# Patient Record
Sex: FEMALE | Race: OTHER
[De-identification: names, ages, dates, MRNs, and addresses within clinical notes are randomized per-mention and may not be internally consistent; named-entity substitution may affect disease eponyms.]

---

## 2019-07-16 ENCOUNTER — HOSPITAL ENCOUNTER (EMERGENCY)
Dept: HOSPITAL 74 - JER | Age: 34
Discharge: HOME | End: 2019-07-16
Payer: COMMERCIAL

## 2019-07-16 VITALS — DIASTOLIC BLOOD PRESSURE: 61 MMHG | HEART RATE: 90 BPM | TEMPERATURE: 98.4 F | SYSTOLIC BLOOD PRESSURE: 120 MMHG

## 2019-07-16 VITALS — BODY MASS INDEX: 23.7 KG/M2

## 2019-07-16 DIAGNOSIS — Z3A.12: ICD-10-CM

## 2019-07-16 DIAGNOSIS — O23.41: ICD-10-CM

## 2019-07-16 DIAGNOSIS — O26.891: Primary | ICD-10-CM

## 2019-07-16 LAB
ALBUMIN SERPL-MCNC: 3.3 G/DL (ref 3.4–5)
ALP SERPL-CCNC: 64 U/L (ref 45–117)
ALT SERPL-CCNC: 28 U/L (ref 13–61)
ANION GAP SERPL CALC-SCNC: 6 MMOL/L (ref 8–16)
APPEARANCE UR: CLEAR
AST SERPL-CCNC: 20 U/L (ref 15–37)
BACTERIA # UR AUTO: 835.2 /HPF
BILIRUB SERPL-MCNC: 0.4 MG/DL (ref 0.2–1)
BILIRUB UR STRIP.AUTO-MCNC: NEGATIVE MG/DL
BUN SERPL-MCNC: 5.7 MG/DL (ref 7–18)
CALCIUM SERPL-MCNC: 8.8 MG/DL (ref 8.5–10.1)
CASTS URNS QL MICRO: 4 /LPF (ref 0–8)
CHLORIDE SERPL-SCNC: 107 MMOL/L (ref 98–107)
CO2 SERPL-SCNC: 24 MMOL/L (ref 21–32)
COLOR UR: YELLOW
CREAT SERPL-MCNC: 0.5 MG/DL (ref 0.55–1.3)
DEPRECATED RDW RBC AUTO: 11.6 % (ref 11.6–15.6)
EPITH CASTS URNS QL MICRO: 4.5 /HPF
GLUCOSE SERPL-MCNC: 70 MG/DL (ref 74–106)
HCT VFR BLD CALC: 35 % (ref 32.4–45.2)
HGB BLD-MCNC: 12.4 GM/DL (ref 10.7–15.3)
KETONES UR QL STRIP: (no result)
LEUKOCYTE ESTERASE UR QL STRIP.AUTO: (no result)
MCH RBC QN AUTO: 31.3 PG (ref 25.7–33.7)
MCHC RBC AUTO-ENTMCNC: 35.5 G/DL (ref 32–36)
MCV RBC: 88.1 FL (ref 80–96)
NITRITE UR QL STRIP: NEGATIVE
PH UR: 7.5 [PH] (ref 5–8)
PLATELET # BLD AUTO: 233 K/MM3 (ref 134–434)
PMV BLD: 9 FL (ref 7.5–11.1)
POTASSIUM SERPLBLD-SCNC: 4.1 MMOL/L (ref 3.5–5.1)
PROT SERPL-MCNC: 6.8 G/DL (ref 6.4–8.2)
PROT UR QL STRIP: NEGATIVE
PROT UR QL STRIP: NEGATIVE
RBC # BLD AUTO: 3.97 M/MM3 (ref 3.6–5.2)
RBC # BLD AUTO: 6 /HPF (ref 0–4)
SODIUM SERPL-SCNC: 137 MMOL/L (ref 136–145)
SP GR UR: 1.01 (ref 1.01–1.03)
UROBILINOGEN UR STRIP-MCNC: 1 MG/DL (ref 0.2–1)
WBC # BLD AUTO: 10.4 K/MM3 (ref 4–10)
WBC # UR AUTO: 10 /HPF (ref 0–5)

## 2019-07-16 PROCEDURE — 3E0337Z INTRODUCTION OF ELECTROLYTIC AND WATER BALANCE SUBSTANCE INTO PERIPHERAL VEIN, PERCUTANEOUS APPROACH: ICD-10-PCS

## 2019-07-16 PROCEDURE — 3E033NZ INTRODUCTION OF ANALGESICS, HYPNOTICS, SEDATIVES INTO PERIPHERAL VEIN, PERCUTANEOUS APPROACH: ICD-10-PCS

## 2019-07-16 PROCEDURE — 3E033GC INTRODUCTION OF OTHER THERAPEUTIC SUBSTANCE INTO PERIPHERAL VEIN, PERCUTANEOUS APPROACH: ICD-10-PCS

## 2019-07-16 NOTE — PDOC
History of Present Illness





- General


Chief Complaint: Headache


Stated Complaint: DIZZINESS


Time Seen by Provider: 19 12:06


History Source: Patient, Sibling


Exam Limitations: Language Barrier ([place] only, translation via sister)





- History of Present Illness


Initial Comments: 


35 yo , currently 12 weeks pregnant, presenting with neck pain. [place] 

speaking only, translation via sister. Woke up out of sleep around 0330 this 

morning with the neck pain and occipital headache, later developed nausea and 

vomiting X6 around 0800. Patient went to Urgent Care around 0900 and was told 

she had "palpitations and high blood pressure", referred to the ED. Has had 

intermittent nausea and vomiting for the past 2 weeks with poor oral intake 

despite taking a nausea medication that her OBGYN gave her every day. Endorses 

dizziness that feels like "the room is spinning" and is triggered by sitting up 

quickly.





Denies any tingling in her fingers, neurological deficits or weakness, diarrhea/

constipation, CP, SOB, abdominal pain, vaginal bleeding, fevers/chills, or 

recent illness.  


19 12:21








Past History





- Past Medical History


Allergies/Adverse Reactions: 


 Allergies











Allergy/AdvReac Type Severity Reaction Status Date / Time


 


Penicillins Allergy   Verified 19 12:55











Home Medications: 


Ambulatory Orders





Nitrofurantoin Monohyd/M-Cryst [Macrobid -] 100 mg PO BID #14 capsule 19 


Prenat 115/Iron Fum/Folic/Dss [Prenatal 19 Tablet] 1 each PO DAILY 19 








COPD: No


Thyroid Disease: No





- Surgical History


Cholecystectomy: Yes





- Immunization History


Immunization Up to Date: Yes





- Suicide/Smoking/Psychosocial Hx


Smoking History: Never smoked


Information on smoking cessation initiated: No


Hx Alcohol Use: No


Drug/Substance Use Hx: No





Neuro Specific PMHX





- Complaint Specific PMHX


Glaucoma: No


Herniated Disk: No


Laminectomy: No


Migraine: No


Multiple Sclerosis: No


Neuropathy: No


TIA: No





**Review of Systems





- Review of Systems


Able to Perform ROS?: Yes


Is the patient limited English proficient: Yes


Constitutional: No: Chills, Fever


HEENTM: No: Eye Pain, Blurred Vision, Recent change in vision, Double Vision, 

Ear Discharge, Nose Pain, Tinnitus, Hearing Loss, Throat Swelling, Mouth Pain


Respiratory: No: Cough, Orthopnea, Shortness of Breath


Cardiac (ROS): No: Chest Pain, Lightheadedness


ABD/GI: Yes: Nausea, Poor Fluid Intake, Vomiting.  No: Constipated, Diarrhea


: No: Burning, Dysuria, Discharge, Frequency


Musculoskeletal: Yes: Neck Pain (occipital down through cervical spine only)


Neurological: Yes: Headache (occipital), Dizziness ("room is spinning", 

triggered by standing quickly).  No: Numbness, Paresthesia, Pre-Existing Deficit

, Seizure, Tingling, Weakness, Ataxia





*Physical Exam





- Vital Signs


 Last Vital Signs











Temp Pulse Resp BP Pulse Ox


 


 98.5 F   114 H  16   113/70   98 


 


 19 11:09  19 11:09  19 11:09  19 11:09  19 11:09














- Physical Exam


General Appearance: Yes: Appropriately Dressed


HEENT: positive: EOMI, DARWIN, Normal ENT Inspection, Normal Voice, Symmetrical, 

Pharynx Normal


Neck: positive: Trachea midline, Normal Thyroid


Respiratory/Chest: positive: Lungs Clear, Normal Breath Sounds.  negative: 

Respiratory Distress


Cardiovascular: positive: Regular Rhythm, Tachycardia, Other (sinus arrhythmia)


Gastrointestinal/Abdominal: positive: Normal Bowel Sounds, Soft.  negative: 

Tender


Musculoskeletal: positive: Normal Inspection, Other (cervical spine tenderness)

.  negative: CVA Tenderness


Extremity: positive: Normal Inspection, Normal Range of Motion


Integumentary: positive: Normal Color, Dry, Cold


Neurologic: positive: CNs II-XII NML intact, Fully Oriented, Alert, Normal Mood/

Affect, Normal Response, Motor Strength 5/5, Responsive, Finger to Nose (intact)

.  negative: Facial Droop, Sensory Deficit, Confused, Disoriented





ED Treatment Course





- LABORATORY


CBC & Chemistry Diagram: 


 19 12:34





 19 12:34





Medical Decision Making





- Medical Decision Making


EKG reviewed, NSR, HR 75, no STEMI.


19 12:07


Dizziness seems consistent with orthostatic hypotension in s/o poor oral intake

, N/V, and with elevated HR. Will give 1L NS, 1000mg acetaminophen for HA/neck 

pain, Zofran 4mg IVpush, also get CBC CMP UA/UC.


19 12:44


UA reviewed, prescribed Macrobid BID for a week, sent script to patient's 

pharmacy.


19 13:22


CMP reviewed, Glucose 70


19 13:25


Patient drinking some apple juice and eating some honey loan crackers. Will 

hold off on repleting glucose.


19 13:29


WBC 10.4


19 13:56








*DC/Admit/Observation/Transfer


Diagnosis at time of Disposition: 


 UTI (urinary tract infection)








- Discharge Dispostion


Disposition: HOME


Condition at time of disposition: Improved


Decision to Admit order: No





- Prescriptions


Prescriptions: 


Nitrofurantoin Monohyd/M-Cryst [Macrobid -] 100 mg PO BID #14 capsule





- Referrals


Referrals: 


Craole Claros MD [Primary Care Provider] - 





- Patient Instructions


Printed Discharge Instructions:  Managing Symptoms of Pregnancy, DI for Urinary 

Tract Infection (UTI)


Additional Instructions: 


Please take your Macrobid twice a day for one full week. Please follow up with 

your primary care doctor. Do your best to remain hydrated. Return to the ED for 

abdominal pain, vaginal bleeding, or nausea and vomiting that does not respond 

to medications.





- Post Discharge Activity

## 2019-07-16 NOTE — PDOC
Documentation entered by Fili Darling SCRIBE, acting as scribe for Stephanie Johnson MD.








Stephanie Johnson MD:  This documentation has been prepared by the Lisset tavarez Elijah, SCRIBE, under my direction and personally reviewed by me in its 

entirety.  I confirm that the documentation accurately reflects all work, 

treatment, procedures, and medical decision making performed by me.  





Attending Attestation





- Resident


Resident Name: CodyMichoacano





- ED Attending Attestation


I have performed the following: I have examined & evaluated the patient, The 

case was reviewed & discussed with the resident, I agree w/resident's findings 

& plan, Exceptions are as noted





- HPI


HPI: 





19 13:51


Patient is a 34 year old female  (13 weeks) with no reported significant 

past medical history who presents to the ED with neck pain that radiates all 

throughout the back of her neck lasting for x10 hours. Patient associates nausea

, x6 episodes of vomiting, and dizziness and was seen at Urgent Care, found to 

have palpitations and was told to come into the ED. Patient also noted that she 

has had nausea and vomiting for the last two weeks.


Denies Abdominal Pain.


Allergies: Penicillins 


PCP: Dr. Claros








- Physicial Exam


PE: 





GENERAL: Awake, alert, and fully oriented, in no acute distress. +Mild pallor


HEAD: No signs of trauma


EYES: PERRLA, EOMI, sclera anicteric, conjunctiva clear


ENT: Auricles normal inspection, hearing grossly normal, nares patent, 

oropharynx clear without exudates. Dry mucosa


NECK: Normal ROM, supple, no lymphadenopathy, JVD, or masses


LUNGS: Breath sounds equal, clear to auscultation bilaterally.  No wheezes, and 

no crackles


HEART: Regular rate and rhythm, normal S1 and S2, no murmurs, rubs or gallops


ABDOMEN: Soft, nontender, normoactive bowel sounds.  No guarding, no rebound.  

No masses


EXTREMITIES: Normal range of motion, no edema.  No clubbing or cyanosis. No 

cords, erythema, or tenderness


NEUROLOGICAL: Cranial nerves II through XII grossly intact.  Normal speech, 

normal gait. Motor and sensation intact


SKIN: Warm, dry, normal turgor, no rashes or lesions noted. 








- Medical Decision Making





Pt appears dehydrated on exam, will rehydrate with NS and D5LR. Will treat with 

ofirmev for neck strain. Stable for DC home when improved.

## 2019-07-17 NOTE — EKG
Test Reason : 

Blood Pressure : ***/*** mmHG

Vent. Rate : 075 BPM     Atrial Rate : 075 BPM

   P-R Int : 122 ms          QRS Dur : 084 ms

    QT Int : 362 ms       P-R-T Axes : 029 015 027 degrees

   QTc Int : 404 ms

 

NORMAL SINUS RHYTHM

CANNOT RULE OUT ANTERIOR INFARCT , AGE UNDETERMINED

ABNORMAL ECG

NO PREVIOUS ECGS AVAILABLE

Confirmed by LIZZIE PHAN MD ([id number]) on 7/17/2019 10:30:09 AM

 

Referred By:             Confirmed By:LIZZIE PHAN MD

## 2020-01-06 ENCOUNTER — HOSPITAL ENCOUNTER (INPATIENT)
Dept: HOSPITAL 74 - JLDR | Age: 35
LOS: 3 days | Discharge: HOME | DRG: 540 | End: 2020-01-09
Attending: STUDENT IN AN ORGANIZED HEALTH CARE EDUCATION/TRAINING PROGRAM | Admitting: STUDENT IN AN ORGANIZED HEALTH CARE EDUCATION/TRAINING PROGRAM
Payer: COMMERCIAL

## 2020-01-06 VITALS — BODY MASS INDEX: 28.8 KG/M2

## 2020-01-06 DIAGNOSIS — Z3A.37: ICD-10-CM

## 2020-01-06 DIAGNOSIS — O34.219: Primary | ICD-10-CM

## 2020-01-06 LAB
ALBUMIN SERPL-MCNC: 2.6 G/DL (ref 3.4–5)
ALP SERPL-CCNC: 503 U/L (ref 45–117)
ALT SERPL-CCNC: 42 U/L (ref 13–61)
ANION GAP SERPL CALC-SCNC: 8 MMOL/L (ref 8–16)
APTT BLD: 29.1 SECONDS (ref 25.2–36.5)
AST SERPL-CCNC: 43 U/L (ref 15–37)
BASOPHILS # BLD: 0.8 % (ref 0–2)
BILIRUB SERPL-MCNC: 0.4 MG/DL (ref 0.2–1)
BUN SERPL-MCNC: 10.2 MG/DL (ref 7–18)
CALCIUM SERPL-MCNC: 9.1 MG/DL (ref 8.5–10.1)
CHLORIDE SERPL-SCNC: 107 MMOL/L (ref 98–107)
CO2 SERPL-SCNC: 21 MMOL/L (ref 21–32)
CREAT SERPL-MCNC: 0.9 MG/DL (ref 0.55–1.3)
CREAT UR-MCNC: 143 MG/DL (ref 30–150)
DEPRECATED RDW RBC AUTO: 13.7 % (ref 11.6–15.6)
EOSINOPHIL # BLD: 0.8 % (ref 0–4.5)
GLUCOSE SERPL-MCNC: 60 MG/DL (ref 74–106)
HCT VFR BLD CALC: 39.1 % (ref 32.4–45.2)
HGB BLD-MCNC: 12.9 GM/DL (ref 10.7–15.3)
INR BLD: 0.95 (ref 0.83–1.09)
LYMPHOCYTES # BLD: 27.1 % (ref 8–40)
MCH RBC QN AUTO: 28.4 PG (ref 25.7–33.7)
MCHC RBC AUTO-ENTMCNC: 33 G/DL (ref 32–36)
MCV RBC: 86 FL (ref 80–96)
MONOCYTES # BLD AUTO: 6.6 % (ref 3.8–10.2)
NEUTROPHILS # BLD: 64.7 % (ref 42.8–82.8)
PLATELET # BLD AUTO: 181 K/MM3 (ref 134–434)
PMV BLD: 10.6 FL (ref 7.5–11.1)
POTASSIUM SERPLBLD-SCNC: 4.6 MMOL/L (ref 3.5–5.1)
PROT SERPL-MCNC: 6.7 G/DL (ref 6.4–8.2)
PT PNL PPP: 11.2 SEC (ref 9.7–13)
RBC # BLD AUTO: 4.54 M/MM3 (ref 3.6–5.2)
SODIUM SERPL-SCNC: 137 MMOL/L (ref 136–145)
WBC # BLD AUTO: 8.5 K/MM3 (ref 4–10)

## 2020-01-06 RX ADMIN — LABETALOL HYDROCHLORIDE SCH MG: 200 TABLET, FILM COATED ORAL at 19:40

## 2020-01-06 RX ADMIN — LABETALOL HYDROCHLORIDE SCH MG: 200 TABLET, FILM COATED ORAL at 22:45

## 2020-01-06 NOTE — PN
Progress Note (short form)





- Note


Progress Note: 


5 mg IV hydralazine and 200mg PO labetalol administered for severe range BP 

following Methergine. Patient is asymptomatic and bleeding has subsided. 

Magnesium will be held due to iatrogenic severe hypertension. BP will be 

monitored closely.

## 2020-01-06 NOTE — PN
Progress Note (short form)





- Note


Progress Note: 





Attended Rpt. C/S for this 35yrs old  mother for GHTN


Mat PNL- nl GBS- neg


Infant delivered - clear fluid, cried soon after 


suctioned/ dried cord 3V


Apgar 9/9, CAN x 1


Infant's PE exam;


Infant alert active- not in distress


HEENT- nl AFOF


Chest B/l symm COR S1-S2 nl nlo heart murmur


 nl female Testis down 


Ext: FROM 


Neuro: Good tone and activity.


RNBC


Watch for resp distrtess


Encourage BF/ Bonding





Problem List





- Problems


(1) 37 weeks gestation of pregnancy


Code(s): Z3A.37 - 37 WEEKS GESTATION OF PREGNANCY   





(2) Gestational hypertension


Code(s): O13.9 - GESTATIONAL HTN W/O SIGNIFICANT PROTEINURIA, UNSP TRIMESTER

## 2020-01-06 NOTE — OP
Operative Note





- Note:


Operative Date: 01/06/20 (dict # [id number])


Pre-Operative Diagnosis: P1 @ 37.1 wks PEC w/o severe features, previous CD 

desiring repeat


Operation: RLTCS


Findings: 





see dictation


Post-Operative Diagnosis: Same as Pre-op


Surgeon: Kodi Benavidez


Assistant: Alvino Fleming


Anesthesia: Spinal


Estimated Blood Loss (mls): 700


Fluid Volume Replaced (mls): 1,000


Operative Report Dictated: Yes

## 2020-01-06 NOTE — HP
Past Medical History





- Primary Care Physician


PCP:: Kodi Benavidez





- Admission


Chief Complaint: mildly elevated BP


History of Present Illness: 





Patient denies any headache, CP, SOB, n/v, RUQ pain. 


History Source: Patient


Limitations to Obtaining History: No Limitations





- Past Medical History


CNS: No: Alzheimer's, CVA, Dementia, Migraine, Multiple Sclerosis, Peripheral 

Neuropathy, Parkinson's, Seizure, Syncope, TIA, Vertigo, Other


Cardiovascular: No: AFIB, Aneurysm, Aortic Insufficiency, Aortic Stenosis, CAD, 

CHF, Deep Vein Thrombosis, HTN, Hyperlipdemia, MI, Mitral Insufficiency, Mitral 

Stenosis, Murmur, Pulmonary Hypertension, Other


Pulmonary: No: Asthma, Bronchitis, Cancer, COPD, O2 Dependent, Pneumonia, 

Previously Intubated, Pulmonary Embolus, Pulmonary Fibrosis, Sleep Apnea, Other


Gastrointestinal: No: Ascites, Cancer, Constipation, Crohn's Disease, 

Diverticulitis, Diverticulosis, Esophageal Varices, Gastritis, GERD, GI Bleed, 

Hemorrhoids, Hiatal Hernia, Inflamatory Bowel Disease, Irritable Bowel Disease, 

Pancreatitis, Peptic Ulcer Disease, Ulcerative Colitis, Other


Hepatobiliary: No: Cirrhosis, Cholelithiasis, Cholecystitis, Choledocholithiasis

, Hepatitis A, Hepatitis B, Hepatitis C, Other


Renal/: No: Renal Failure, Renal Inusuff, BPH, Cancer, Hematuria, Hemodialysis

, Neurogenic Bladder, Renal Calculi, UTI, Other


Reproductive: No: Ectopic Pregnancy, Endometriosis, Fibroids, PID, Polycystic 

Ovary Syndrome, Postmenopausal, Other


...: 3


...Para: 1


...Term: 1


...: 0


...Spon : 1


...Induced : 0


...Multiple Gestation: 0


...LMP: 19


... Weeks Gestation by Dates: 39.3


...EDC by Dates: 01/10/20


...EDC by Sono: 20


Heme/Onc: No: Anemia, B12 Deficiency, Bleeding Disorder, Cancer, Current 

Chemotherapy, Current Radiation Therapy, Hemochromatosis, Hypercoaguable State, 

Myeloproliferative Synd, Sickle Cell Disease, Sickle Cell Trait, 

Thrombocytopenia, Other


Infectious Disease: No: AIDS, C-Diff, Herpes Zoster, HIV, MRSA, STD's, 

Tuberculosis, VREF, Other


Psych: No: Addictions, Anxiety, Bipolar, Depression, Panic, Psychosis, 

Schizophrenia, Other


Musculoskeletal: No: Bursitis, Chronic low back pain, Hemiparesis, Hemiplegia, 

Osteoarthritis, Paraplegia, Other


ENT: No: Allergic Rhinitis, Sinusitis, Other


Endocrine: No: Gregory's Disease, Cushing's Disease, Diabetes Insipidus, 

Diabetes Mellitus, Hyperparathyroidism, Hyperthyroidism, Hypothyroidism, 

Osteopenia, SIADH, Other


Dermatology: No: Basal Cell, Cellulitis, Eczema, Melanoma, Psoriasis, Squamous 

Cell, Other





- Past Surgical History


Past Surgical History: Yes: Cholecystectomy, 


Hx Myomectomy: No


Hx Transabdominal Cerclage: No





- Smoking History


Smoking history: Never smoked


Have you smoked in the past 12 months: No





- Alcohol/Substance Use


Hx Alcohol Use: No





Home Medications





- Allergies


Allergies/Adverse Reactions: 


 Allergies











Allergy/AdvReac Type Severity Reaction Status Date / Time


 


Penicillins Allergy Severe Swelling Verified 20 13:25














- Home Medications


Home Medications: 


Ambulatory Orders





Prenat 115/Iron Fum/Folic/Dss [Prenatal 19 Tablet] 1 each PO DAILY 19 











Family Medical History


Family History: Unable to Obtain





Review of Systems





- Review of Systems


Constitutional: reports: No Symptoms


Eyes: reports: No Symptoms


HENT: reports: No Symptoms


Neck: reports: No Symptoms


Cardiovascular: reports: No Symptoms


Respiratory: reports: No Symptoms


Gastrointestinal: reports: No Symptoms


Genitourinary: reports: No Symptoms


Breasts: reports: No Symptoms Reported


Musculoskeletal: reports: No Symptoms


Integumentary: reports: No Symptoms


Neurological: reports: No Symptoms


Endocrine: reports: No Symptoms


Hematology/Lymphatic: reports: No Symptoms


Psychiatric: reports: No Symptoms





Physical Exam - Maternity


Vital Signs: 


 Vital Signs











Temperature  98.0 F   20 15:01


 


Pulse Rate  77   20 15:01


 


Respiratory Rate  18   20 15:01


 


Blood Pressure  129/95   20 15:01


 


O2 Sat by Pulse Oximetry (%)      











Constitutional: Yes: Well Nourished


Eyes: Yes: WNL


HENT: Yes: Atraumatic


Neck: Yes: Supple


Cardiovascular: Yes: Regular Rate and Rhythm


Breast(s): Yes: Other (deferred)





- Abdominal Exam/OB


Number of Fetuses: Single


Fetal Presentation: Vertex


Contractions: Yes


Regularity: Irregular


Intensity: Unaware


Fetal Monitor Mode: External


Fetal Heart Rate (range): 135


Category: I


Accelerations: Uniform


Decelerations: None





- Vaginal Exam/OB


Vaginal Bleediing: No





- Physical Exam


Musculoskeletal: Yes: WNL, Muscle Weakness


Edema: Yes


Edema: LLE: Trace, RLE: Trace


Integumentary: Yes: WNL


...Motor Strength: WNL


Psychiatric: Yes: Alert, Oriented





- Labs


Lab Results: 


 CBC, BMP





 20 14:01 











Imaging





- Results


Ultrasound: Report Reviewed





Assessment/Plan





34 y/o @ 37.1wks, prior CS x1 desiring repeat, GHTN and BP in mild range, 

asymptomatic, reassuring fetal status, declined BTL. Risks and complications of 

procedure discussed as well as indication for early term delivery. All 

questions answered and informed consent obtained


-Proceed with CD

## 2020-01-07 LAB
ALBUMIN SERPL-MCNC: 1.8 G/DL (ref 3.4–5)
ALP SERPL-CCNC: 343 U/L (ref 45–117)
ALT SERPL-CCNC: 35 U/L (ref 13–61)
ANION GAP SERPL CALC-SCNC: 7 MMOL/L (ref 8–16)
AST SERPL-CCNC: 48 U/L (ref 15–37)
BASOPHILS # BLD: 0.3 % (ref 0–2)
BILIRUB SERPL-MCNC: 0.8 MG/DL (ref 0.2–1)
BUN SERPL-MCNC: 6.2 MG/DL (ref 7–18)
CALCIUM SERPL-MCNC: 7.9 MG/DL (ref 8.5–10.1)
CHLORIDE SERPL-SCNC: 109 MMOL/L (ref 98–107)
CO2 SERPL-SCNC: 21 MMOL/L (ref 21–32)
CREAT SERPL-MCNC: 0.7 MG/DL (ref 0.55–1.3)
DEPRECATED RDW RBC AUTO: 13.8 % (ref 11.6–15.6)
EOSINOPHIL # BLD: 0.3 % (ref 0–4.5)
GLUCOSE SERPL-MCNC: 59 MG/DL (ref 74–106)
HCT VFR BLD CALC: 30.2 % (ref 32.4–45.2)
HGB BLD-MCNC: 10.2 GM/DL (ref 10.7–15.3)
LYMPHOCYTES # BLD: 14.9 % (ref 8–40)
MCH RBC QN AUTO: 28.7 PG (ref 25.7–33.7)
MCHC RBC AUTO-ENTMCNC: 33.7 G/DL (ref 32–36)
MCV RBC: 85.1 FL (ref 80–96)
MONOCYTES # BLD AUTO: 4.7 % (ref 3.8–10.2)
NEUTROPHILS # BLD: 79.8 % (ref 42.8–82.8)
PLATELET # BLD AUTO: 139 K/MM3 (ref 134–434)
PMV BLD: 10.2 FL (ref 7.5–11.1)
POTASSIUM SERPLBLD-SCNC: 4.3 MMOL/L (ref 3.5–5.1)
PROT SERPL-MCNC: 4.7 G/DL (ref 6.4–8.2)
RBC # BLD AUTO: 3.55 M/MM3 (ref 3.6–5.2)
SODIUM SERPL-SCNC: 137 MMOL/L (ref 136–145)
WBC # BLD AUTO: 10.2 K/MM3 (ref 4–10)

## 2020-01-07 RX ADMIN — LABETALOL HYDROCHLORIDE SCH MG: 200 TABLET, FILM COATED ORAL at 09:28

## 2020-01-07 RX ADMIN — ACETAMINOPHEN PRN MG: 325 TABLET ORAL at 19:42

## 2020-01-07 RX ADMIN — SIMETHICONE CHEW TAB 80 MG PRN MG: 80 TABLET ORAL at 19:41

## 2020-01-07 RX ADMIN — Medication SCH MLS/HR: at 05:28

## 2020-01-07 RX ADMIN — Medication SCH: at 19:33

## 2020-01-07 RX ADMIN — LABETALOL HYDROCHLORIDE SCH MG: 200 TABLET, FILM COATED ORAL at 21:48

## 2020-01-07 NOTE — OP
DATE OF OPERATION:  

 

DATE OF DICTATION:  2020

 

PREOPERATIVE DIAGNOSIS:  35-year-old para 1 at 37 and 1 weeks' gestation,

preeclampsia without severe features, previous  section desiring repeat

 delivery.  Declined sterilization.

 

POSTOPERATIVE DIAGNOSIS:  35-year-old para 1 at 37 and 1 weeks' gestation,

preeclampsia without severe features, previous  section desiring repeat

 delivery.  Declined sterilization.

 

PROCEDURE:  Repeat low transverse  section.

 

COMPLICATIONS:  None.

 

ATTENDING:  Clayton Breaux MD 

 

FIRST ASSISTANT:  ROSEMARY Roper 

 

ANESTHESIA:  Spinal.

 

ESTIMATED BLOOD LOSS:  700 mL.

 

INTRAVENOUS FLUIDS:  1 L of crystalloid.

 

FINDINGS:  Anterior abdominal wall anatomy consistent with prior  
section. 

Moderate amount of subcutaneous adipose tissue.  The fascia was nonfibrotic,

nonadherent to the underlying rectus muscles.  The rectus muscles fused to each 
other

in the midline.  No parietal peritoneal visceral adhesions.  The bladder was 
adherent

to the lower uterine segment.  Bladder dome and rectus muscle fascial interface 
were

intact at the conclusion of the procedure.  Infant in cephalic presentation.  
Clear

amniotic fluid.  Live viable female.  Uterus and bilateral tubes and ovaries

consistent with normal anatomy.  The ovaries were bilaterally polycystic.

 

DESCRIPTION OF PROCEDURE:  The patient was taken to the operating room where

anesthesia was found to be adequate.  She was then prepped and draped in a 
normal

sterile fashion.  Urinary Kruse catheter was placed atraumatically.  Appropriate

time-out took place.  Pfannenstiel skin incision was made with a scalpel 
following

prior  section scar.  Incision was carried to the underlying fascia 
with the

Bovie.  The fascia was incised in the midline and incision extended laterally 
with

sharp dissection.  The underlying rectus muscles were dissected off sharply.  
The

rectus muscles were elevated at the midline and dissected off sharply and 
superiorly.

 Incidental entry to the peritoneal cavity revealed no visceral parietal 
peritoneal

adhesions.  The incision was extended laterally with blunt and sharp 
dissection. 

Bladder blade was placed in the lower uterine segment, was slightly effaced. 

Transverse lower uterine segment incision was made with a scalpel approximately 
5 cm

above the vesicouterine junction.  The incision was extended laterally with 
blunt

dissection.  Amniotomy revealed clear amniotic fluid.  Infant was delivered 
through

the surgical incision with mild fundal pressure without difficulty.  Delayed

umbilical cord clamping took place, and the infant was handed off to the NICU 
staff. 

The placenta was delivered manually and intact.  The uterus was exteriorized 
through

the surgical incision, and the intrauterine cavity was cleared of all clots and

debris.  The lower uterine segment incision was reapproximated with 1-0 Polysorb

running, locked suture.  Excellent structural reapproximation and hemostasis 
with

1-layer suture.  The uterus was internalized to the pelvic cavity, and gutters 
were

cleared of all clots and debris.  Inspection of the uterine incision once again

revealed excellent hemostasis.  Bladder dome and rectus muscle fascial 
interface as

described previously.  Fascial incision was reapproximated with 0 Polysorb 
running,

nonlocked suture.  Excellent structural reapproximation achieved and confirmed 
by

digital palpation by the surgeon.  Subcutaneous tissues were copiously 
irrigated and

bleeders neutralized with Bovie cautery.  The skin incision was reapproximated 
with

4-0 Biosyn subcuticular sutures.  Excellent reapproximation hemostasis 
achieved. 

Patient in stable condition to the recovery room.  Instrument count was 
reported as

correct x2 by the staff. 

 

 

CLAYTON BREAUX MD [id number]

DD: 2020 17:47

DT: 2020 13:28

Job #:  [id number]

[facility]

## 2020-01-07 NOTE — PN
Post Partum Progress Note





- Subjective


Subjective: 





not yet ambulating, napoles in place, lochia decreased, passing flatus, no n/v


Type of Delivery: Repeat C/S


Vital Signs: 


 Vital Signs











Temperature  97.8 F   01/07/20 06:00


 


Pulse Rate  54 L  01/07/20 06:00


 


Respiratory Rate  18   01/07/20 06:00


 


Blood Pressure  131/72   01/07/20 06:00


 


O2 Sat by Pulse Oximetry (%)      











Breast Exam: Yes: Other (deferred)


Uterus: Yes: Fundus Firm


Incision: Yes: Dressing dry and intact, Sutures intact


Abdomen/GI: Yes: Abdomen soft, Passing flatus (slightly distended, + BS)


Lochia, amount: Moderate


Extremities: Yes: Calves non-tender


Perineum: Yes: Intact


Activity: Other





- Labs


Labs: 


 CBC











WBC  8.5 K/mm3 (4.0-10.0)   01/06/20  14:01    


 


RBC  4.54 M/mm3 (3.60-5.2)   01/06/20  14:01    


 


Hgb  12.9 GM/dL (10.7-15.3)   01/06/20  14:01    


 


Hct  39.1 % (32.4-45.2)   01/06/20  14:01    


 


MCV  86.0 fl (80-96)   01/06/20  14:01    


 


MCH  28.4 pg (25.7-33.7)   01/06/20  14:01    


 


MCHC  33.0 g/dl (32.0-36.0)   01/06/20  14:01    


 


RDW  13.7 % (11.6-15.6)  D 01/06/20  14:01    


 


Plt Count  181 K/MM3 (134-434)   01/06/20  14:01    


 


MPV  10.6 fl (7.5-11.1)  D 01/06/20  14:01    


 


Absolute Neuts (auto)  5.5 K/mm3 (1.5-8.0)   01/06/20  14:01    


 


Neutrophils %  64.7 % (42.8-82.8)   01/06/20  14:01    


 


Lymphocytes %  27.1 % (8-40)   01/06/20  14:01    


 


Monocytes %  6.6 % (3.8-10.2)   01/06/20  14:01    


 


Eosinophils %  0.8 % (0-4.5)   01/06/20  14:01    


 


Basophils %  0.8 % (0-2.0)   01/06/20  14:01    


 


Nucleated RBC %  0 % (0-0)   01/06/20  14:01    














Assessment/Plan





POD # 1 in stable condition, S/P RCD due to PEC w/o severe features, S/P 

iatrogenic severe BPs and BPs now in normal range on PO antihypertensives.


Am labs


Encourage ambulation


Advance diet


Napoles out


Continue PO labetalol

## 2020-01-08 RX ADMIN — SIMETHICONE CHEW TAB 80 MG PRN MG: 80 TABLET ORAL at 09:21

## 2020-01-08 RX ADMIN — ACETAMINOPHEN PRN MG: 325 TABLET ORAL at 09:20

## 2020-01-08 RX ADMIN — LABETALOL HYDROCHLORIDE SCH MG: 200 TABLET, FILM COATED ORAL at 09:21

## 2020-01-08 RX ADMIN — LABETALOL HYDROCHLORIDE SCH MG: 200 TABLET, FILM COATED ORAL at 22:37

## 2020-01-08 RX ADMIN — ACETAMINOPHEN PRN MG: 325 TABLET ORAL at 22:37

## 2020-01-08 NOTE — PN
Post Partum Progress Note





- Subjective


Subjective: 





pain scale 6/10 


oob 


voiding without difficulty 


bm not done 


Post Partum Day: 2


Type of Delivery: Repeat C/S


Vital Signs: 


 Vital Signs











Temperature  98.4 F   20 22:00


 


Pulse Rate  109 H  20 06:00


 


Respiratory Rate  18   20 06:00


 


Blood Pressure  131/79   20 06:00


 


O2 Sat by Pulse Oximetry (%)      











Breast Exam: Yes: Soft, Other (BF ).  No: Engorged


Uterus: Yes: Fundus Firm, Fundus below umbilicus, Non-tender


Incision: Yes: Sutures intact (ster).  No: Redness, Oozing


Abdomen/GI: Yes: Abdomen soft, Passing flatus, Tolerating PO (diet).  No: 

Abdominal Distention, Tender


Lochia: Yes: Rubra


Lochia, amount: Moderate


Extremities: Yes: Calves non-tender


Perineum: Yes: Intact


Activity: Ambulating





- Labs


Labs: 


 CBC











WBC  10.2 K/mm3 (4.0-10.0)  H  20  07:53    


 


RBC  3.55 M/mm3 (3.60-5.2)  L  20  07:53    


 


Hgb  10.2 GM/dL (10.7-15.3)  L  20  07:53    


 


Hct  30.2 % (32.4-45.2)  L D 20  07:53    


 


MCV  85.1 fl (80-96)   20  07:53    


 


MCH  28.7 pg (25.7-33.7)   20  07:53    


 


MCHC  33.7 g/dl (32.0-36.0)   20  07:53    


 


RDW  13.8 % (11.6-15.6)   20  07:53    


 


Plt Count  139 K/MM3 (134-434)  D 20  07:53    


 


MPV  10.2 fl (7.5-11.1)   20  07:53    


 


Absolute Neuts (auto)  8.1 K/mm3 (1.5-8.0)  H  20  07:53    


 


Neutrophils %  79.8 % (42.8-82.8)  D 20  07:53    


 


Lymphocytes %  14.9 % (8-40)  D 20  07:53    


 


Monocytes %  4.7 % (3.8-10.2)   20  07:53    


 


Eosinophils %  0.3 % (0-4.5)   20  07:53    


 


Basophils %  0.3 % (0-2.0)   20  07:53    


 


Nucleated RBC %  0 % (0-0)   20  07:53    














Problem List





- Problems


(1) Postpartum examination following  delivery


Code(s): Z39.2 - ENCOUNTER FOR ROUTINE POSTPARTUM FOLLOW-UP   





Assessment/Plan


stable. 


ct po care 


pt requests for stool softner

## 2020-01-09 VITALS — SYSTOLIC BLOOD PRESSURE: 113 MMHG | TEMPERATURE: 97.9 F | HEART RATE: 112 BPM | DIASTOLIC BLOOD PRESSURE: 86 MMHG

## 2020-01-09 LAB
BASOPHILS # BLD: 0.5 % (ref 0–2)
DEPRECATED RDW RBC AUTO: 14.3 % (ref 11.6–15.6)
EOSINOPHIL # BLD: 1.8 % (ref 0–4.5)
HCT VFR BLD CALC: 29.7 % (ref 32.4–45.2)
HGB BLD-MCNC: 9.9 GM/DL (ref 10.7–15.3)
LYMPHOCYTES # BLD: 9.5 % (ref 8–40)
MCH RBC QN AUTO: 28.6 PG (ref 25.7–33.7)
MCHC RBC AUTO-ENTMCNC: 33.5 G/DL (ref 32–36)
MCV RBC: 85.5 FL (ref 80–96)
MONOCYTES # BLD AUTO: 4 % (ref 3.8–10.2)
NEUTROPHILS # BLD: 84.2 % (ref 42.8–82.8)
PLATELET # BLD AUTO: 206 K/MM3 (ref 134–434)
PMV BLD: 9.4 FL (ref 7.5–11.1)
RBC # BLD AUTO: 3.47 M/MM3 (ref 3.6–5.2)
WBC # BLD AUTO: 15.6 K/MM3 (ref 4–10)

## 2020-01-09 RX ADMIN — ACETAMINOPHEN PRN MG: 325 TABLET ORAL at 10:49

## 2020-01-09 RX ADMIN — LABETALOL HYDROCHLORIDE SCH MG: 200 TABLET, FILM COATED ORAL at 09:30

## 2020-01-09 NOTE — DS
Physical Examination


Vital Signs: 


 Vital Signs











Temperature  98.3 F   20 06:00


 


Pulse Rate  77   20 06:00


 


Respiratory Rate  20   20 06:00


 


Blood Pressure  136/89   20 06:00


 


O2 Sat by Pulse Oximetry (%)      











Labs: 


 CBC, BMP





 20 07:53 





 20 06:00 











Discharge Summary


Problems reviewed: Yes


Reason For Visit: 


Current Active Problems





37 weeks gestation of pregnancy (Acute)


Gestational hypertension (Acute)


Postpartum examination following  delivery (Acute)








Procedures: Principal: Repeat 


Hospital Course: 


Patient presented for a scheduled Repeat 


She had an uncomplicated repeat 


She met all postpartum milestones


She was discharged home on POD#3





OCTAVIANO. MD Zoe


Condition: Stable





- Instructions


Diet, Activity, Other Instructions: 


Return to regular diet as tolerated. regular activity as cleared by provider. 

Call MD with any questions or concerns, follow up in 1 week from discharge.


Referrals: 


Kodi Benavidez MD [Staff Physician] - 


Disposition: HOME





- Home Medications


Comprehensive Discharge Medication List: 


Ambulatory Orders





Prenat 115/Iron Fum/Folic/Dss [Prenatal 19 Tablet] 1 each PO DAILY 19 


Acetaminophen [Tylenol] 650 mg PO Q6H PRN #30 capsule MDD 6 20 


Ibuprofen 600 mg PO Q6H PRN #30 tablet 20 


Oxycodone HCl 5 mg PO Q6H PRN 3 Days #12 tablet MDD 5 20

## 2020-01-09 NOTE — PATH
Surgical Pathology Report



Patient Name:  SANTOS REYES, JULISSA

Accession #:  [id number]

Med. Rec. #:  [id number]                                                        

   /Age/Gender:  1985 (Age: 35) / F

Account:  [id number]                                                          

             Location: [facility]

Taken:  2020

Received:  2020

Reported:  2020

Physicians:  Kodi Benavidez MD

  



Specimen(s) Received

 PLACENTA 





Clinical History

, 37.1 weeks, gestational hypertension, LGA

 , cholecystectomy , removal cyst left breast 







Final Diagnosis

PLACENTA,  SECTION:

729 G THIRD TRIMESTER PLACENTA WITH TRIVASCULAR UMBILICAL CORD AND UNREMARKABLE

PLACENTAL MEMBRANES.





***Electronically Signed***

Chely Pearson M.D.





Gross Description

The specimen is received fresh labeled placenta and is a 729 gram, 23.0 x 6.5 x

2.8 cm. placenta with attached membranes and umbilical cord.  The attached

membranes are tan, translucent with focal opacities and insert marginally.  The

umbilical cord measures 35 cm. in length and averages 1.1 cm. in diameter.  The

cord inserts eccentrically, 4 cm. to the nearest margin.  No true knots or

strictures are identified. Cut surface of the umbilical cord reveals 3 vessels.

The fetal surface is grey-blue with minimal fibrin deposition and appropriate

caliber vessels.  The maternal surface is red-brown with focal defects. 

Sectioning reveals red-brown, spongy parenchyma.  No lesions are identified. 

Representative sections are submitted in three cassettes as follows: 1- membrane

rolls and umbilical cord; 2-3- full thickness sections of placenta.

/2020



[facility]/2020

## 2020-01-09 NOTE — PN
Post Partum Progress Note





- Subjective


Subjective: 





Pain controlled. No fevers/chills. Ambulating w/o difficulty


Post Partum Day: 3


Type of Delivery: Repeat C/S


Vital Signs: 


 Vital Signs











Temperature  98.3 F   20 06:00


 


Pulse Rate  77   20 06:00


 


Respiratory Rate  20   20 06:00


 


Blood Pressure  136/89   20 06:00


 


O2 Sat by Pulse Oximetry (%)      











Uterus: Yes: Fundus below umbilicus


Incision: Yes: Dressing dry and intact


Abdomen/GI: Yes: Abdomen soft, Passing flatus, Tolerating PO


Lochia, amount: Small


Extremities: Yes: Calves non-tender


Activity: Ambulating





- Labs


Labs: 


 CBC











WBC  10.2 K/mm3 (4.0-10.0)  H  20  07:53    


 


RBC  3.55 M/mm3 (3.60-5.2)  L  20  07:53    


 


Hgb  10.2 GM/dL (10.7-15.3)  L  20  07:53    


 


Hct  30.2 % (32.4-45.2)  L D 20  07:53    


 


MCV  85.1 fl (80-96)   20  07:53    


 


MCH  28.7 pg (25.7-33.7)   20  07:53    


 


MCHC  33.7 g/dl (32.0-36.0)   20  07:53    


 


RDW  13.8 % (11.6-15.6)   20  07:53    


 


Plt Count  139 K/MM3 (134-434)  D 20  07:53    


 


MPV  10.2 fl (7.5-11.1)   20  07:53    


 


Absolute Neuts (auto)  8.1 K/mm3 (1.5-8.0)  H  20  07:53    


 


Neutrophils %  79.8 % (42.8-82.8)  D 20  07:53    


 


Lymphocytes %  14.9 % (8-40)  D 20  07:53    


 


Monocytes %  4.7 % (3.8-10.2)   20  07:53    


 


Eosinophils %  0.3 % (0-4.5)   20  07:53    


 


Basophils %  0.3 % (0-2.0)   20  07:53    


 


Nucleated RBC %  0 % (0-0)   20  07:53    














Assessment/Plan





36yo  s/p RLTCS, POD#3


Routine PP care


PO pain control


Cont HTN meds, stable for now


D/C to home POD#4





PHILIPPE Enriquez MD

## 2020-01-27 ENCOUNTER — HOSPITAL ENCOUNTER (EMERGENCY)
Dept: HOSPITAL 74 - JER | Age: 35
Discharge: HOME | End: 2020-01-27
Payer: COMMERCIAL

## 2020-01-27 VITALS — SYSTOLIC BLOOD PRESSURE: 126 MMHG | DIASTOLIC BLOOD PRESSURE: 79 MMHG | TEMPERATURE: 97.5 F | HEART RATE: 83 BPM

## 2020-01-27 VITALS — BODY MASS INDEX: 30.2 KG/M2

## 2020-01-27 DIAGNOSIS — Z88.0: ICD-10-CM

## 2020-01-27 LAB
ALBUMIN SERPL-MCNC: 3.2 G/DL (ref 3.4–5)
ALP SERPL-CCNC: 184 U/L (ref 45–117)
ALT SERPL-CCNC: 35 U/L (ref 13–61)
ANION GAP SERPL CALC-SCNC: 4 MMOL/L (ref 8–16)
APPEARANCE UR: CLEAR
AST SERPL-CCNC: 28 U/L (ref 15–37)
BACTERIA # UR AUTO: 75 /HPF
BASOPHILS # BLD: 0.8 % (ref 0–2)
BILIRUB SERPL-MCNC: 0.5 MG/DL (ref 0.2–1)
BILIRUB UR STRIP.AUTO-MCNC: NEGATIVE MG/DL
BUN SERPL-MCNC: 9.5 MG/DL (ref 7–18)
CALCIUM SERPL-MCNC: 8.7 MG/DL (ref 8.5–10.1)
CASTS URNS QL MICRO: 5 /LPF (ref 0–8)
CHLORIDE SERPL-SCNC: 106 MMOL/L (ref 98–107)
CO2 SERPL-SCNC: 28 MMOL/L (ref 21–32)
COLOR UR: YELLOW
CREAT SERPL-MCNC: 0.6 MG/DL (ref 0.55–1.3)
DEPRECATED RDW RBC AUTO: 15.3 % (ref 11.6–15.6)
EOSINOPHIL # BLD: 3.3 % (ref 0–4.5)
EPITH CASTS URNS QL MICRO: 3.3 /HPF
GLUCOSE SERPL-MCNC: 90 MG/DL (ref 74–106)
HCT VFR BLD CALC: 34.3 % (ref 32.4–45.2)
HGB BLD-MCNC: 11.4 GM/DL (ref 10.7–15.3)
KETONES UR QL STRIP: NEGATIVE
LEUKOCYTE ESTERASE UR QL STRIP.AUTO: (no result)
LYMPHOCYTES # BLD: 15.7 % (ref 8–40)
MCH RBC QN AUTO: 28.2 PG (ref 25.7–33.7)
MCHC RBC AUTO-ENTMCNC: 33.4 G/DL (ref 32–36)
MCV RBC: 84.6 FL (ref 80–96)
MONOCYTES # BLD AUTO: 6.5 % (ref 3.8–10.2)
NEUTROPHILS # BLD: 73.7 % (ref 42.8–82.8)
NITRITE UR QL STRIP: NEGATIVE
PH UR: 6.5 [PH] (ref 5–8)
PLATELET # BLD AUTO: 430 K/MM3 (ref 134–434)
PMV BLD: 7.9 FL (ref 7.5–11.1)
POTASSIUM SERPLBLD-SCNC: 4.3 MMOL/L (ref 3.5–5.1)
PROT SERPL-MCNC: 7.2 G/DL (ref 6.4–8.2)
PROT UR QL STRIP: (no result)
PROT UR QL STRIP: NEGATIVE
RBC # BLD AUTO: 3 /HPF (ref 0–4)
RBC # BLD AUTO: 4.05 M/MM3 (ref 3.6–5.2)
SODIUM SERPL-SCNC: 137 MMOL/L (ref 136–145)
SP GR UR: 1.02 (ref 1.01–1.03)
UROBILINOGEN UR STRIP-MCNC: 1 MG/DL (ref 0.2–1)
WBC # BLD AUTO: 9.7 K/MM3 (ref 4–10)
WBC # UR AUTO: 10 /HPF (ref 0–5)

## 2020-01-27 PROCEDURE — 3E0337Z INTRODUCTION OF ELECTROLYTIC AND WATER BALANCE SUBSTANCE INTO PERIPHERAL VEIN, PERCUTANEOUS APPROACH: ICD-10-PCS

## 2020-01-27 NOTE — PDOC
History of Present Illness





- General


Chief Complaint: Pain, Acute


Stated Complaint: FEVER


Time Seen by Provider: 20 15:11


History Source: Patient


Exam Limitations: No Limitations





- History of Present Illness


Initial Comments: 





20 17:09


Patient is a 35-year-old female who presents to the ED with complaint of 

headache and fever since yesterday.  She states she has a T-max of 103F.  She 

took Tylenol at 12 PM today.  She denies any cough, sore throat or dysuria.  

She had a  25 days ago but denies any abdominal pain.  She does admit 

to bilateral foot swelling with pain in her bilateral feet.  She denies any 

dizziness.


20 18:23








Past History





- Past Medical History


Allergies/Adverse Reactions: 


 Allergies











Allergy/AdvReac Type Severity Reaction Status Date / Time


 


Penicillins Allergy Severe Swelling Verified 20 13:25











Home Medications: 


Ambulatory Orders





Prenat 115/Iron Fum/Folic/Dss [Prenatal 19 Tablet] 1 each PO DAILY 19 








Asthma: No


Cancer: No


Cardiac Disorders: No


COPD: No


Diabetes: No


HTN: Yes (gestational this pregancy)


Seizures: No


Thyroid Disease: No





- Surgical History


Cholecystectomy: Yes





- Immunization History


Immunization Up to Date: Yes





- Psycho Social/Smoking Cessation Hx


Smoking History: Never smoked


Have you smoked in the past 12 months: No


Information on smoking cessation initiated: No


Hx Alcohol Use: No


Drug/Substance Use Hx: No


Hx Substance Use Treatment: No





**Review of Systems





- Review of Systems


Comments:: 





20 18:24


- Review of Systems


Able to Perform ROS?: Yes


Constitutional: No: , Chills, Loss of Appetite, Night Sweats, Weakness, + Fever


HEENTM: No: Eye Pain, Vision changes, Ear Pain, Throat Pain, Throat Swelling, 

Mouth Pain, Difficulty Swallowing


Respiratory: No: Cough, Shortness of Breath, Wheezing, Sputum Production


Cardiac (ROS): No: Chest Pain, Chest Tightness, Palpitations, Irregular Heart 

Beat, Edema


ABD/GI: No: Nausea, Vomiting, Abdominal Pain, Diarrhea


: No Dysuria, No Hematuria, No Frequency, No Urgency, No Vaginal Discharge/

Pain, No Penile Discharge/Pain


Musculoskeletal: No: Muscle Pain, Back Pain, Joint Pain, Muscle Weakness, Neck 

Pain


Integumentary: No: Lesions, Rash


Neurological: No: , Numbness, Tingling, Weakness, Speech Difficulties, + 

Headache





*Physical Exam





- Vital Signs


 Last Vital Signs











Temp Pulse Resp BP Pulse Ox


 


 100.5 F H  115 H  18   129/87   100 


 


 20 15:11  20 15:11  20 15:11  20 15:11  20 15:11














- Physical Exam





20 18:25


- Physical Exam


General Appearance:  Nourished, Appropriately Dressed, No Distress


HEENT: EOMI, Normal Voice, No Pharyngeal Erythema, No Muffled/Hoarse voice, No 

Tonsillar Exudate, No Tonsillar Erythema, No Nasal Congestion, No Rhinorrhea, 

Hearing Grossly Normal, TMs Normal, No TM Bulging, No TM Dullness, No TM 

Erythema


Neck: Supple, No Lymphadenopathy (R), No Lymphadenopathy (L), No Rigidity, No 

Decreased range of motion; No nuchal rigidity, negative Kernig's and negative 

Brudzinski's


Respiratory/Chest: Lungs Clear, Normal Breath Sounds.  No Respiratory Distress, 

No Accessory Muscle Use


Cardiovascular: Regular Rhythm, Regular Rate, S1, S2


Gastrointestinal/Abdominal: Normal Bowel Sounds, Soft.  Non-tender, No Guarding

, No Rebound, No Rigidity


Musculoskeletal: Normal Inspection.  No Decreased Range of Motion


Extremity: Normal Capillary Refill, Normal Inspection; 1+ nonpitting edema b/l 

LE


Integumentary:  Normal Color, Dry.  No Rash


Neurologic: CNs II-XII NML intact, Fully Oriented, Alert, Normal Mood/Affect, 

Normal Response





ED Treatment Course





- LABORATORY


CBC & Chemistry Diagram: 


 20 15:30





 20 15:30





- ADDITIONAL ORDERS


Additional order review: 


 Laboratory  Results











  20





  15:30 15:30


 


Sodium   137


 


Potassium   4.3


 


Chloride   106


 


Carbon Dioxide   28


 


Anion Gap   4 L


 


BUN   9.5


 


Creatinine   0.6


 


Est GFR (CKD-EPI)AfAm   136.87


 


Est GFR (CKD-EPI)NonAf   118.09


 


Random Glucose   90


 


Lactic Acid  0.9 


 


Calcium   8.7


 


Total Bilirubin   0.5


 


AST   28


 


ALT   35


 


Alkaline Phosphatase   184 H


 


Total Protein   7.2


 


Albumin   3.2 L








 











  20





  15:30


 


RBC  4.05


 


MCV  84.6


 


MCHC  33.4


 


RDW  15.3


 


MPV  7.9  D


 


Neutrophils %  73.7


 


Lymphocytes %  15.7  D


 


Monocytes %  6.5


 


Eosinophils %  3.3  D


 


Basophils %  0.8











20 19:00


 Laboratory Tests











  20





  15:20


 


Urine Color  Yellow


 


Urine Appearance  Clear


 


Urine pH  6.5


 


Ur Specific Gravity  1.019


 


Urine Protein  1+ H


 


Urine Glucose (UA)  Negative


 


Urine Ketones  Negative


 


Urine Blood  2+ H


 


Urine Nitrite  Negative


 


Urine Bilirubin  Negative


 


Urine Urobilinogen  1.0


 


Ur Leukocyte Esterase  Trace


 


Urine WBC (Auto)  10


 


Urine RBC (Auto)  3


 


Urine Casts (Auto)  5


 


U Epithel Cells (Auto)  3.3


 


Urine Bacteria (Auto)  75.0














Medical Decision Making





- Medical Decision Making





20 18:26


[id number]: paged ob/gyn, pending a call back


: paged Dr. Benavidez again





Pt is a 36 y/o female who presents to the ED with complaint of headache and 

fever since yesterday.  She is 25 days post partum . She is low 

probability of meningismus.  She has minimal b/l LE edema and has 1+ protein in 

her urine.  Her BP is stable and not elevated.  


-Motrin PO


-Zofran PO


-labs


-Dr. Reema niño


-Will reassess





20 18:57


I spoke with Dr. Burch Who is covering for Dr. Benavidez and she states she 

is not concerned about the 1+ protein in the urine.  Since the patient's 

headache resolved with Motrin in the ED she would just continue to take Tylenol 

for the low-grade fever.  Secondary to the patient having no nuchal rigidity 

and no elevated WBC count there is little concern for meningismus.  The patient 

must follow-up with Dr. Benavidez within the next 1 to 2 days for repeat 

evaluation.





Discharge





- Discharge Information


Problems reviewed: Yes


Clinical Impression/Diagnosis: 


Fever


Qualifiers:


 Fever type: due to other condition Qualified Code(s): R50.81 - Fever 

presenting with conditions classified elsewhere





Condition: Stable


Disposition: HOME





- Follow up/Referral


Referrals: 


ON STAFF,NOT [Primary Care Provider] - 


Kodi Benavidez MD [Staff Physician] - 24 hours (Follow up in the clinic in 1-2 

days)





- Patient Discharge Instructions


Patient Printed Discharge Instructions:  DI for Fever (Symptom) -- Adult


Additional Instructions: 


 Get plenty of rest and drink plenty of fluids.  Take Tylenol for fever.  You 

can take Motrin for headaches.  Follow-up with Dr. Benavidez tomorrow. 


Print Language: Algerian





- Post Discharge Activity

## 2020-01-27 NOTE — PDOC
Rapid Medical Evaluation


Chief Complaint: Pain, Acute


Time Seen by Provider: 01/27/20 15:11


Medical Evaluation: 


 Allergies











Allergy/AdvReac Type Severity Reaction Status Date / Time


 


Penicillins Allergy Severe Swelling Verified 01/06/20 13:25











01/27/20 15:12


Pt c/o: fever, lower abd pain, no diarrhea, c section 21 days ago, + nausea


Pt on brief exam: febrile, mid suprapubic tenderness, no cva tenderness


pt ordered for: ua, u cx, motrin


Pt to proceed to the ED





**Discharge Disposition





- Diagnosis


Fever


Qualifiers:


 Fever type: due to other condition Qualified Code(s): R50.81 - Fever 

presenting with conditions classified elsewhere








- Discharge Dispostion


Disposition: HOME


Condition at time of disposition: Stable





- Referrals


Referrals: 


Kodi Benavidez MD [Staff Physician] - 24 hours (Follow up in the clinic in 1-2 

days)


ON STAFF,NOT [Non Staff, Medical] - 





- Patient Instructions


Printed Discharge Instructions:  DI for Fever (Symptom) -- Adult


Additional Instructions: 


 Get plenty of rest and drink plenty of fluids.  Take Tylenol for fever.  You 

can take Motrin for headaches.  Follow-up with Dr. Benavidez tomorrow. 


Print Language: Niuean





- Post Discharge Activity